# Patient Record
Sex: FEMALE | Race: ASIAN | NOT HISPANIC OR LATINO | Employment: UNEMPLOYED | URBAN - METROPOLITAN AREA
[De-identification: names, ages, dates, MRNs, and addresses within clinical notes are randomized per-mention and may not be internally consistent; named-entity substitution may affect disease eponyms.]

---

## 2022-12-17 ENCOUNTER — APPOINTMENT (OUTPATIENT)
Dept: CT IMAGING | Facility: HOSPITAL | Age: 47
End: 2022-12-17
Attending: EMERGENCY MEDICINE
Payer: COMMERCIAL

## 2022-12-17 ENCOUNTER — HOSPITAL ENCOUNTER (EMERGENCY)
Facility: HOSPITAL | Age: 47
Discharge: HOME | End: 2022-12-17
Attending: EMERGENCY MEDICINE | Admitting: EMERGENCY MEDICINE
Payer: COMMERCIAL

## 2022-12-17 VITALS
DIASTOLIC BLOOD PRESSURE: 72 MMHG | OXYGEN SATURATION: 100 % | RESPIRATION RATE: 18 BRPM | SYSTOLIC BLOOD PRESSURE: 138 MMHG | HEART RATE: 76 BPM | TEMPERATURE: 97.6 F

## 2022-12-17 DIAGNOSIS — M54.40 BACK PAIN OF LUMBAR REGION WITH SCIATICA: ICD-10-CM

## 2022-12-17 DIAGNOSIS — R10.9 LEFT FLANK PAIN: ICD-10-CM

## 2022-12-17 DIAGNOSIS — N39.0 URINARY TRACT INFECTION WITHOUT HEMATURIA, SITE UNSPECIFIED: ICD-10-CM

## 2022-12-17 LAB
ALBUMIN UR-MCNC: NEGATIVE MG/DL
ANION GAP SERPL CALCULATED.3IONS-SCNC: 13 MMOL/L (ref 7–15)
APPEARANCE UR: CLEAR
BACTERIA #/AREA URNS HPF: ABNORMAL /HPF
BASOPHILS # BLD AUTO: 0 10E3/UL (ref 0–0.2)
BASOPHILS NFR BLD AUTO: 0 %
BILIRUB UR QL STRIP: NEGATIVE
BUN SERPL-MCNC: 9.4 MG/DL (ref 6–20)
CALCIUM SERPL-MCNC: 9.1 MG/DL (ref 8.6–10)
CHLORIDE SERPL-SCNC: 100 MMOL/L (ref 98–107)
COLOR UR AUTO: ABNORMAL
CREAT SERPL-MCNC: 0.62 MG/DL (ref 0.51–0.95)
DEPRECATED HCO3 PLAS-SCNC: 24 MMOL/L (ref 22–29)
EOSINOPHIL # BLD AUTO: 0.2 10E3/UL (ref 0–0.7)
EOSINOPHIL NFR BLD AUTO: 2 %
ERYTHROCYTE [DISTWIDTH] IN BLOOD BY AUTOMATED COUNT: 12.5 % (ref 10–15)
GFR SERPL CREATININE-BSD FRML MDRD: >90 ML/MIN/1.73M2
GLUCOSE SERPL-MCNC: 88 MG/DL (ref 70–99)
GLUCOSE UR STRIP-MCNC: NEGATIVE MG/DL
HCG UR QL: NEGATIVE
HCT VFR BLD AUTO: 42.7 % (ref 35–47)
HGB BLD-MCNC: 13.4 G/DL (ref 11.7–15.7)
HGB UR QL STRIP: NEGATIVE
IMM GRANULOCYTES # BLD: 0 10E3/UL
IMM GRANULOCYTES NFR BLD: 0 %
KETONES UR STRIP-MCNC: NEGATIVE MG/DL
LEUKOCYTE ESTERASE UR QL STRIP: ABNORMAL
LYMPHOCYTES # BLD AUTO: 2.9 10E3/UL (ref 0.8–5.3)
LYMPHOCYTES NFR BLD AUTO: 26 %
MCH RBC QN AUTO: 29.1 PG (ref 26.5–33)
MCHC RBC AUTO-ENTMCNC: 31.4 G/DL (ref 31.5–36.5)
MCV RBC AUTO: 93 FL (ref 78–100)
MONOCYTES # BLD AUTO: 0.6 10E3/UL (ref 0–1.3)
MONOCYTES NFR BLD AUTO: 6 %
MUCOUS THREADS #/AREA URNS LPF: PRESENT /LPF
NEUTROPHILS # BLD AUTO: 7.5 10E3/UL (ref 1.6–8.3)
NEUTROPHILS NFR BLD AUTO: 66 %
NITRATE UR QL: NEGATIVE
NRBC # BLD AUTO: 0 10E3/UL
NRBC BLD AUTO-RTO: 0 /100
PH UR STRIP: 6 [PH] (ref 5–7)
PLATELET # BLD AUTO: 339 10E3/UL (ref 150–450)
POTASSIUM SERPL-SCNC: 3.5 MMOL/L (ref 3.4–5.3)
RBC # BLD AUTO: 4.6 10E6/UL (ref 3.8–5.2)
RBC URINE: 1 /HPF
SODIUM SERPL-SCNC: 137 MMOL/L (ref 136–145)
SP GR UR STRIP: 1.02 (ref 1–1.03)
SQUAMOUS EPITHELIAL: 9 /HPF
UROBILINOGEN UR STRIP-MCNC: <2 MG/DL
WBC # BLD AUTO: 11.2 10E3/UL (ref 4–11)
WBC URINE: 35 /HPF

## 2022-12-17 PROCEDURE — 74176 CT ABD & PELVIS W/O CONTRAST: CPT

## 2022-12-17 PROCEDURE — 99285 EMERGENCY DEPT VISIT HI MDM: CPT | Mod: 25

## 2022-12-17 PROCEDURE — 96374 THER/PROPH/DIAG INJ IV PUSH: CPT

## 2022-12-17 PROCEDURE — 87086 URINE CULTURE/COLONY COUNT: CPT | Performed by: EMERGENCY MEDICINE

## 2022-12-17 PROCEDURE — 250N000011 HC RX IP 250 OP 636: Performed by: EMERGENCY MEDICINE

## 2022-12-17 PROCEDURE — 81001 URINALYSIS AUTO W/SCOPE: CPT | Performed by: EMERGENCY MEDICINE

## 2022-12-17 PROCEDURE — 36415 COLL VENOUS BLD VENIPUNCTURE: CPT | Performed by: EMERGENCY MEDICINE

## 2022-12-17 PROCEDURE — 80048 BASIC METABOLIC PNL TOTAL CA: CPT | Performed by: EMERGENCY MEDICINE

## 2022-12-17 PROCEDURE — 81025 URINE PREGNANCY TEST: CPT | Performed by: EMERGENCY MEDICINE

## 2022-12-17 PROCEDURE — 85025 COMPLETE CBC W/AUTO DIFF WBC: CPT | Performed by: EMERGENCY MEDICINE

## 2022-12-17 PROCEDURE — 250N000013 HC RX MED GY IP 250 OP 250 PS 637: Performed by: EMERGENCY MEDICINE

## 2022-12-17 RX ORDER — CIPROFLOXACIN 500 MG/1
500 TABLET, FILM COATED ORAL 2 TIMES DAILY
Qty: 14 TABLET | Refills: 0 | Status: SHIPPED | OUTPATIENT
Start: 2022-12-17 | End: 2022-12-24

## 2022-12-17 RX ORDER — KETOROLAC TROMETHAMINE 15 MG/ML
15 INJECTION, SOLUTION INTRAMUSCULAR; INTRAVENOUS ONCE
Status: COMPLETED | OUTPATIENT
Start: 2022-12-17 | End: 2022-12-17

## 2022-12-17 RX ORDER — ACETAMINOPHEN 325 MG/1
650 TABLET ORAL ONCE
Status: COMPLETED | OUTPATIENT
Start: 2022-12-17 | End: 2022-12-17

## 2022-12-17 RX ORDER — OXYCODONE AND ACETAMINOPHEN 5; 325 MG/1; MG/1
1 TABLET ORAL ONCE
Status: COMPLETED | OUTPATIENT
Start: 2022-12-17 | End: 2022-12-17

## 2022-12-17 RX ORDER — CYCLOBENZAPRINE HCL 10 MG
10 TABLET ORAL ONCE
Status: COMPLETED | OUTPATIENT
Start: 2022-12-17 | End: 2022-12-17

## 2022-12-17 RX ORDER — CYCLOBENZAPRINE HCL 10 MG
10 TABLET ORAL 3 TIMES DAILY PRN
Qty: 20 TABLET | Refills: 0 | Status: SHIPPED | OUTPATIENT
Start: 2022-12-17 | End: 2022-12-24

## 2022-12-17 RX ORDER — NAPROXEN 500 MG/1
500 TABLET ORAL 2 TIMES DAILY WITH MEALS
Qty: 24 TABLET | Refills: 0 | Status: SHIPPED | OUTPATIENT
Start: 2022-12-17

## 2022-12-17 RX ADMIN — KETOROLAC TROMETHAMINE 15 MG: 15 INJECTION, SOLUTION INTRAMUSCULAR; INTRAVENOUS at 11:54

## 2022-12-17 RX ADMIN — ACETAMINOPHEN 650 MG: 325 TABLET ORAL at 11:54

## 2022-12-17 RX ADMIN — OXYCODONE HYDROCHLORIDE AND ACETAMINOPHEN 1 TABLET: 5; 325 TABLET ORAL at 13:33

## 2022-12-17 RX ADMIN — CYCLOBENZAPRINE 10 MG: 10 TABLET, FILM COATED ORAL at 11:54

## 2022-12-17 ASSESSMENT — ACTIVITIES OF DAILY LIVING (ADL): ADLS_ACUITY_SCORE: 35

## 2022-12-17 NOTE — DISCHARGE INSTRUCTIONS
You were seen in the emergency department at [facility] for left-sided flank pain and leg pain.  Your evaluation did suggest that you are having another urinary tract infection.  We are going to start you on an antibiotic for this.  We also think that your back and leg pains are suggestive of back pain and possibly sciatica.  In addition to the antibiotic we are going to prescribe you a couple of pain medications called naproxen which is a strong anti-inflammatory you can take twice a day as well as Flexeril which is a muscle relaxer you can use up to 3 times a day.  Please avoid strenuous activity as you rest and recover.  We would like you to follow-up in clinic within a week or 2 to review any ongoing symptoms.

## 2022-12-17 NOTE — ED TRIAGE NOTES
Patient with L sided back pain that radiates down the back of her L leg. Pain has been present since Friday. Patient denies history of this in the past.      Triage Assessment     Row Name 12/17/22 1128       Triage Assessment (Adult)    Airway WDL WDL       Respiratory WDL    Respiratory WDL WDL       Cardiac WDL    Cardiac WDL WDL

## 2022-12-17 NOTE — ED PROVIDER NOTES
EMERGENCY DEPARTMENT ENCOUNTER      NAME: Obinna Wilkins  AGE: 46 year old female  YOB: 1975  MRN: [id number]  EVALUATION DATE & TIME: No admission date for patient encounter.    PCP: No primary care provider on file.    ED PROVIDER: Daniel Longoria M.D.      Chief Complaint   Patient presents with     Back Pain       FINAL IMPRESSION:  1. Left flank pain    2. Back pain of lumbar region with sciatica    3. Urinary tract infection without hematuria, site unspecified        ED COURSE & MEDICAL DECISION MAKIN year old female presents to the Emergency Department for evaluation of left flank pain back pain and leg pain.  She is vitally stable and well-appearing when she arrives to the emergency department.  She indicates pain in her left upper flank and radiating down her entire back and to some degree down her posterior left leg.  Her exam seems more consistent with low back pain with sciatica however she also has a history of pyelonephritis and given that the pain does extend up her back, did elect to obtain CT without contrast which was negative for kidney stone.  Urinalysis does appear somewhat concerning for infection with pyuria, but with some squamous cell growth you will cells so possibly some degree of contamination.  She does have an elevated white blood cell count.  Discussed a treatment course of ciprofloxacin for pyelonephritis and patient was in agreement.  Also discussed NSAIDs and Flexeril for what may well be muscular skeletal back pain with some sciatica component.  She does not have any weakness of her lower extremities or anything to suggest the need for acute neuroimaging at this time.  Patient was in agreement with this plan.  She was discharged in stable condition.    At the conclusion of the encounter I discussed the results of all of the tests and the disposition. The questions were answered. The patient or family acknowledged understanding and was agreeable with the care plan.        Medical Decision Making    History:    Supplemental history from: Documented in HPI, if applicable    External Record(s) reviewed: Documented in HPI, if applicable.    Work Up:    Chart documentation includes differential considered and any EKGs or imaging interpreted by provider.    In additional to work up documented, I considered the following work up: See chart documentation, if applicable.    External consultation:    Discussion of management with another provider: See chart documentation, if applicable    Complicating factors:    Care impacted by chronic illness: None    Care affected by social determinants of health: N/A    Disposition considerations: Discharge. I prescribed additional prescription strength medication(s) as charted. N/A.            MEDICATIONS GIVEN IN THE EMERGENCY:  Medications   oxyCODONE-acetaminophen (PERCOCET) 5-325 MG per tablet 1 tablet (has no administration in time range)   ketorolac (TORADOL) injection 15 mg (15 mg Intravenous Given 12/17/22 1154)   cyclobenzaprine (FLEXERIL) tablet 10 mg (10 mg Oral Given 12/17/22 1154)   acetaminophen (TYLENOL) tablet 650 mg (650 mg Oral Given 12/17/22 1154)       NEW PRESCRIPTIONS STARTED AT TODAY'S ER VISIT  New Prescriptions    CIPROFLOXACIN (CIPRO) 500 MG TABLET    Take 1 tablet (500 mg) by mouth 2 times daily for 7 days    CYCLOBENZAPRINE (FLEXERIL) 10 MG TABLET    Take 1 tablet (10 mg) by mouth 3 times daily as needed for muscle spasms    NAPROXEN (NAPROSYN) 500 MG TABLET    Take 1 tablet (500 mg) by mouth 2 times daily (with meals)          =================================================================    [facility]    Patient information was obtained from: Patient    Use of : Yes (Phone) - Language Yessy Wilkins is a 46 year old female with no pertinent history who presents to this ED via walk-in for evaluation of back pain.    Patient endorses left lower back pain which has been constant for 2 days. She describes the  pain as sharp, saying it is worse with movement and radiates to her left flank and left leg. No recent trauma. Patient has been applying topical herbal remedies but has not taken any other medications.     Patient denies dysuria, hematuria, incontinence, weakness, fever, abdominal pain, bowel changes, and all other relevant symptoms.      REVIEW OF SYSTEMS   All systems reviewed and negative except as noted in HPI.    PAST MEDICAL HISTORY:  History reviewed. No pertinent past medical history.    PAST SURGICAL HISTORY:  History reviewed. No pertinent surgical history.        CURRENT MEDICATIONS:    Current Facility-Administered Medications   Medication     oxyCODONE-acetaminophen (PERCOCET) 5-325 MG per tablet 1 tablet     Current Outpatient Medications   Medication     ciprofloxacin (CIPRO) 500 MG tablet     cyclobenzaprine (FLEXERIL) 10 MG tablet     naproxen (NAPROSYN) 500 MG tablet     ondansetron (ZOFRAN ODT) 4 MG disintegrating tablet     phenazopyridine (PYRIDIUM) 200 MG tablet         ALLERGIES:  No Known Allergies    FAMILY HISTORY:  History reviewed. No pertinent family history.    SOCIAL HISTORY:   Social History     Socioeconomic History     Marital status:        VITALS:  /77   Pulse 81   Temp 97.6  F (36.4  C) (Temporal)   Resp 18   SpO2 100%     PHYSICAL EXAM    Constitutional: Well developed, Well nourished, NAD.  HENT: Normocephalic, Atraumatic. Neck Supple.  Eyes: EOMI, Conjunctiva normal.  Respiratory: Breathing comfortably on room air. Speaks full sentences easily. Lungs clear to ascultation.  Cardiovascular: Normal heart rate, Regular rhythm. No peripheral edema.  Abdomen: Soft, nontender.  There is left-sided CVA tenderness  Musculoskeletal: Good range of motion in all major joints. No major deformities noted.  Integument: Warm, Dry.  Neurologic: Alert & awake, Normal motor function, Normal sensory function, No focal deficits noted.  Patient is able to squat and stand on  tiptoes.  There is no saddle anesthesia.  Psychiatric: Cooperative. Affect appropriate.     LAB:  All pertinent labs reviewed and interpreted.  Labs Ordered and Resulted from Time of ED Arrival to Time of ED Departure   ROUTINE UA WITH MICROSCOPIC REFLEX TO CULTURE - Abnormal       Result Value    Color Urine Light Yellow      Appearance Urine Clear      Glucose Urine Negative      Bilirubin Urine Negative      Ketones Urine Negative      Specific Gravity Urine 1.018      Blood Urine Negative      pH Urine 6.0      Protein Albumin Urine Negative      Urobilinogen Urine <2.0      Nitrite Urine Negative      Leukocyte Esterase Urine 500 Glenn/uL (*)     Bacteria Urine Few (*)     Mucus Urine Present (*)     RBC Urine 1      WBC Urine 35 (*)     Squamous Epithelials Urine 9 (*)    CBC WITH PLATELETS AND DIFFERENTIAL - Abnormal    WBC Count 11.2 (*)     RBC Count 4.60      Hemoglobin 13.4      Hematocrit 42.7      MCV 93      MCH 29.1      MCHC 31.4 (*)     RDW 12.5      Platelet Count 339      % Neutrophils 66      % Lymphocytes 26      % Monocytes 6      % Eosinophils 2      % Basophils 0      % Immature Granulocytes 0      NRBCs per 100 WBC 0      Absolute Neutrophils 7.5      Absolute Lymphocytes 2.9      Absolute Monocytes 0.6      Absolute Eosinophils 0.2      Absolute Basophils 0.0      Absolute Immature Granulocytes 0.0      Absolute NRBCs 0.0     HCG QUALITATIVE URINE - Normal    hCG Urine Qualitative Negative     BASIC METABOLIC PANEL   URINE CULTURE       RADIOLOGY:  Reviewed all pertinent imaging. Please see official radiology report.  Abd/pelvis CT no contrast - Stone Protocol   Final Result   IMPRESSION:       1.  No urinary tract calculi or obstruction.   2.  No focal inflammatory process in the abdomen or pelvis.               Zachary JENSEN, am serving as a scribe to document services personally performed by Dr. Daniel Longoria, based on my observation and the provider's statements to me. Daniel JENSEN  MD Swati attest that Zachary Molina is acting in a scribe capacity, has observed my performance of the services and has documented them in accordance with my direction.    Daniel Longoria M.D.  Emergency Medicine  [facility] EMERGENCY DEPARTMENT  [address] [place] [id number]  [phone]  Dept: [phone]      Daniel Longoria MD  12/17/22 2924

## 2022-12-17 NOTE — ED NOTES
On hold waiting for  to begin triage for 20 minutes. Another RN now attempting to reach .   To get better and follow your care plan as instructed.

## 2022-12-18 LAB — BACTERIA UR CULT: NO GROWTH

## 2023-06-25 ENCOUNTER — HOSPITAL ENCOUNTER (EMERGENCY)
Facility: HOSPITAL | Age: 48
Discharge: HOME | End: 2023-06-25
Attending: FAMILY MEDICINE | Admitting: FAMILY MEDICINE
Payer: COMMERCIAL

## 2023-06-25 VITALS
HEART RATE: 78 BPM | RESPIRATION RATE: 18 BRPM | SYSTOLIC BLOOD PRESSURE: 113 MMHG | TEMPERATURE: 98.6 F | DIASTOLIC BLOOD PRESSURE: 70 MMHG | OXYGEN SATURATION: 98 %

## 2023-06-25 DIAGNOSIS — M54.16 LUMBAR RADICULOPATHY: ICD-10-CM

## 2023-06-25 PROCEDURE — 96372 THER/PROPH/DIAG INJ SC/IM: CPT | Performed by: FAMILY MEDICINE

## 2023-06-25 PROCEDURE — 250N000011 HC RX IP 250 OP 636: Mod: JZ | Performed by: FAMILY MEDICINE

## 2023-06-25 PROCEDURE — 99284 EMERGENCY DEPT VISIT MOD MDM: CPT

## 2023-06-25 RX ORDER — CYCLOBENZAPRINE HCL 10 MG
10 TABLET ORAL 3 TIMES DAILY PRN
Qty: 20 TABLET | Refills: 0 | Status: SHIPPED | OUTPATIENT
Start: 2023-06-25 | End: 2023-07-01

## 2023-06-25 RX ORDER — KETOROLAC TROMETHAMINE 30 MG/ML
30 INJECTION, SOLUTION INTRAMUSCULAR; INTRAVENOUS ONCE
Status: COMPLETED | OUTPATIENT
Start: 2023-06-25 | End: 2023-06-25

## 2023-06-25 RX ORDER — HYDROCODONE BITARTRATE AND ACETAMINOPHEN 5; 325 MG/1; MG/1
1 TABLET ORAL EVERY 6 HOURS PRN
Qty: 6 TABLET | Refills: 0 | Status: SHIPPED | OUTPATIENT
Start: 2023-06-25 | End: 2023-06-28

## 2023-06-25 RX ORDER — IBUPROFEN 400 MG/1
400 TABLET, FILM COATED ORAL EVERY 6 HOURS PRN
Qty: 20 TABLET | Refills: 0 | Status: SHIPPED | OUTPATIENT
Start: 2023-06-25 | End: 2023-07-06

## 2023-06-25 RX ORDER — KETOROLAC TROMETHAMINE 30 MG/ML
30 INJECTION, SOLUTION INTRAMUSCULAR; INTRAVENOUS ONCE
Status: DISCONTINUED | OUTPATIENT
Start: 2023-06-25 | End: 2023-06-25

## 2023-06-25 RX ORDER — PREDNISONE 10 MG/1
TABLET ORAL
Qty: 22 TABLET | Refills: 0 | Status: SHIPPED | OUTPATIENT
Start: 2023-06-25 | End: 2023-07-06

## 2023-06-25 RX ADMIN — KETOROLAC TROMETHAMINE 30 MG: 30 INJECTION, SOLUTION INTRAMUSCULAR; INTRAVENOUS at 15:48

## 2023-06-25 ASSESSMENT — ENCOUNTER SYMPTOMS: BACK PAIN: 1

## 2023-06-25 NOTE — Clinical Note
Obinna Wilkins was seen and treated in our emergency department on 6/25/2023.  She may return to work on 06/28/2023.       If you have any questions or concerns, please don't hesitate to call.      Jordan Easley MD

## 2023-06-25 NOTE — ED TRIAGE NOTES
Patient presents here with lower back pain that has occurred over approx one week. She does heavy lifting at work and she notes that upon standing up and twisting, the pain began. She reports that the pain radiates down her left leg. No loss of bowel or bladder function. She has attempted to take Tylenol with no relief of pain. Has not tried ice or heat therapy. She notes that weight bearing is very painful.     Information gathered with Yessy welleror, #[id number], from [employer].

## 2023-06-25 NOTE — ED PROVIDER NOTES
EMERGENCY DEPARTMENT ENCOUNTER      NAME: Obinna Wilkins  AGE: 47 year old female  YOB: 1975  MRN: [id number]  EVALUATION DATE & TIME: 6/25/2023  3:16 PM    PCP: No Ref-Primary, Physician    ED PROVIDER: Jordan Easley M.D.    Chief Complaint   Patient presents with     Back Pain       FINAL IMPRESSION:  1. Lumbar radiculopathy        ED COURSE & MEDICAL DECISION MAKING:    Pertinent Labs & Imaging studies independently interpreted by me. (See chart for details)  3:33 PM Patient seen and examined.  Patient presents with left buttock pain radiating into the left leg.  No red flag symptoms.  Strength and sensation are intact.  Toradol ordered in the emergency department, patient is stable for discharge with referral to spine care clinic.    At the conclusion of the encounter I discussed the results of all of the tests and the disposition. The questions were answered. The patient or family acknowledged understanding and was agreeable with the care plan.     Medical Decision Making    History:    Supplemental history from: Documented in chart, if applicable    External Record(s) reviewed: Documented in chart, if applicable.    Work Up:    Chart documentation includes differential considered and any EKGs or imaging independently interpreted by provider, where specified.    In additional to work up documented, I considered the following work up: Documented in chart, if applicable.    External consultation:    Discussion of management with another provider: Documented in chart, if applicable    Complicating factors:    Care impacted by chronic illness: N/A    Care affected by social determinants of health: N/A    Disposition considerations: Discharge. I prescribed additional prescription strength medication(s) as charted. See documentation for any additional details.    MEDICATIONS GIVEN IN THE EMERGENCY:  Medications   ketorolac (TORADOL) injection 30 mg (30 mg Intramuscular $Given 6/25/23 0329)       NEW  PRESCRIPTIONS STARTED AT TODAY'S ER VISIT  Discharge Medication List as of 6/25/2023  3:50 PM      START taking these medications    Details   cyclobenzaprine (FLEXERIL) 10 MG tablet Take 1 tablet (10 mg) by mouth 3 times daily as needed for muscle spasms, Disp-20 tablet, R-0, Local Print      HYDROcodone-acetaminophen (NORCO) 5-325 MG tablet Take 1 tablet by mouth every 6 hours as needed for severe pain, Disp-6 tablet, R-0, Local Print      ibuprofen (ADVIL/MOTRIN) 400 MG tablet Take 1 tablet (400 mg) by mouth every 6 hours as needed for moderate pain, Disp-20 tablet, R-0, Local Print      predniSONE (DELTASONE) 10 MG tablet Take 4 tablets (40 mg) by mouth daily for 3 days, THEN 2 tablets (20 mg) daily for 3 days, THEN 1 tablet (10 mg) daily for 3 days, THEN 0.5 tablets (5 mg) daily for 2 days., Disp-22 tablet, R-0, Local Print             =================================================================    HPI    Patient information was obtained from: Patient      Obinna Wilkins is a 47 year old female with no pertinent history on file who presents to this ED via private car for evaluation of back pain.    The patient reports one week of lower back pain with radiation down her left leg. She states that the pain began after lifting a heavy object at work and since worsened last night. No recent falls. No chance of pregnancy. She has not yet taken any pain medications.  No numbness or weakness of the legs.    Denies urinary or bowel incontinence or any other complaints at this time.    REVIEW OF SYSTEMS   Review of Systems   Genitourinary:        Negative for incontinence.   Musculoskeletal: Positive for back pain (lower).   All other systems reviewed and are negative.     All other systems reviewed and negative    PAST MEDICAL HISTORY:  History reviewed. No pertinent past medical history.    PAST SURGICAL HISTORY:  History reviewed. No pertinent surgical history.    CURRENT MEDICATIONS:    No current facility-administered  medications for this encounter.     Current Outpatient Medications   Medication     cyclobenzaprine (FLEXERIL) 10 MG tablet     HYDROcodone-acetaminophen (NORCO) 5-325 MG tablet     ibuprofen (ADVIL/MOTRIN) 400 MG tablet     predniSONE (DELTASONE) 10 MG tablet     naproxen (NAPROSYN) 500 MG tablet     ondansetron (ZOFRAN ODT) 4 MG disintegrating tablet     phenazopyridine (PYRIDIUM) 200 MG tablet       ALLERGIES:  No Known Allergies    FAMILY HISTORY:  History reviewed. No pertinent family history.    SOCIAL HISTORY:   Social History     Socioeconomic History     Marital status:        VITALS:  /70   Pulse 78   Temp 98.6  F (37  C) (Oral)   Resp 18   SpO2 98%     PHYSICAL EXAM:  Physical Exam  Vitals and nursing note reviewed.   Constitutional:       Appearance: Normal appearance.   HENT:      Head: Normocephalic and atraumatic.      Right Ear: External ear normal.      Left Ear: External ear normal.      Nose: Nose normal.      Mouth/Throat:      Mouth: Mucous membranes are moist.   Eyes:      Extraocular Movements: Extraocular movements intact.      Conjunctiva/sclera: Conjunctivae normal.      Pupils: Pupils are equal, round, and reactive to light.   Cardiovascular:      Rate and Rhythm: Normal rate and regular rhythm.   Pulmonary:      Effort: Pulmonary effort is normal.      Breath sounds: Normal breath sounds. No wheezing or rales.   Abdominal:      General: Abdomen is flat. There is no distension.      Palpations: Abdomen is soft.      Tenderness: There is no abdominal tenderness. There is no guarding.   Musculoskeletal:         General: Normal range of motion.      Cervical back: Normal range of motion and neck supple.      Right lower leg: No edema.      Left lower leg: No edema.      Comments: Left lumbar and buttocks tenderness.   Lymphadenopathy:      Cervical: No cervical adenopathy.   Skin:     General: Skin is warm and dry.   Neurological:      General: No focal deficit present.       Mental Status: She is alert and oriented to person, place, and time. Mental status is at baseline.      Comments: No gross focal neurologic deficits   Psychiatric:         Mood and Affect: Mood normal.         Behavior: Behavior normal.         Thought Content: Thought content normal.       I, Antionette Trae, am serving as a scribe to document services personally performed by Dr. Easley based on my observation and the provider's statements to me. I, Jordan Easley MD attest that Antionette Larkin is acting in a scribe capacity, has observed my performance of the services and has documented them in accordance with my direction.    Jordan Easley M.D.  Emergency Medicine  [facility] EMERGENCY DEPARTMENT  [facility] [facility] [id number] [phone]  Dept: [phone]     Jordan Easley MD  06/25/23 8449

## 2023-06-28 ENCOUNTER — HOSPITAL ENCOUNTER (EMERGENCY)
Facility: HOSPITAL | Age: 48
Discharge: HOME | End: 2023-06-28
Payer: COMMERCIAL

## 2023-06-28 VITALS
RESPIRATION RATE: 20 BRPM | DIASTOLIC BLOOD PRESSURE: 71 MMHG | SYSTOLIC BLOOD PRESSURE: 112 MMHG | WEIGHT: 139.2 LBS | OXYGEN SATURATION: 98 % | HEART RATE: 82 BPM | TEMPERATURE: 97.4 F

## 2023-06-28 DIAGNOSIS — M54.42 ACUTE LEFT-SIDED LOW BACK PAIN WITH LEFT-SIDED SCIATICA: ICD-10-CM

## 2023-06-28 PROCEDURE — 250N000013 HC RX MED GY IP 250 OP 250 PS 637

## 2023-06-28 PROCEDURE — 99284 EMERGENCY DEPT VISIT MOD MDM: CPT

## 2023-06-28 PROCEDURE — 250N000011 HC RX IP 250 OP 636: Mod: JZ

## 2023-06-28 PROCEDURE — 96372 THER/PROPH/DIAG INJ SC/IM: CPT | Mod: 59

## 2023-06-28 RX ORDER — KETOROLAC TROMETHAMINE 15 MG/ML
15 INJECTION, SOLUTION INTRAMUSCULAR; INTRAVENOUS ONCE
Status: COMPLETED | OUTPATIENT
Start: 2023-06-28 | End: 2023-06-28

## 2023-06-28 RX ORDER — CYCLOBENZAPRINE HCL 10 MG
10 TABLET ORAL ONCE
Status: COMPLETED | OUTPATIENT
Start: 2023-06-28 | End: 2023-06-28

## 2023-06-28 RX ORDER — LIDOCAINE 4 G/G
1 PATCH TOPICAL ONCE
Status: DISCONTINUED | OUTPATIENT
Start: 2023-06-28 | End: 2023-06-28 | Stop reason: HOSPADM

## 2023-06-28 RX ORDER — LIDOCAINE 4 G/G
1 PATCH TOPICAL EVERY 24 HOURS
Qty: 3 PATCH | Refills: 0 | Status: SHIPPED | OUTPATIENT
Start: 2023-06-28

## 2023-06-28 RX ORDER — OXYCODONE HYDROCHLORIDE 5 MG/1
5 TABLET ORAL ONCE
Status: COMPLETED | OUTPATIENT
Start: 2023-06-28 | End: 2023-06-28

## 2023-06-28 RX ORDER — ACETAMINOPHEN 500 MG
1000 TABLET ORAL ONCE
Status: COMPLETED | OUTPATIENT
Start: 2023-06-28 | End: 2023-06-28

## 2023-06-28 RX ADMIN — OXYCODONE HYDROCHLORIDE 5 MG: 5 TABLET ORAL at 10:13

## 2023-06-28 RX ADMIN — CYCLOBENZAPRINE 10 MG: 10 TABLET, FILM COATED ORAL at 11:51

## 2023-06-28 RX ADMIN — ACETAMINOPHEN 1000 MG: 500 TABLET ORAL at 11:51

## 2023-06-28 RX ADMIN — KETOROLAC TROMETHAMINE 15 MG: 15 INJECTION, SOLUTION INTRAMUSCULAR; INTRAVENOUS at 11:52

## 2023-06-28 RX ADMIN — LIDOCAINE 1 PATCH: 4 PATCH TOPICAL at 10:12

## 2023-06-28 ASSESSMENT — ACTIVITIES OF DAILY LIVING (ADL): ADLS_ACUITY_SCORE: 33

## 2023-06-28 ASSESSMENT — ENCOUNTER SYMPTOMS
DIARRHEA: 0
BACK PAIN: 1
CHILLS: 0
NUMBNESS: 0
FEVER: 0
WEAKNESS: 0
DIAPHORESIS: 0
DYSURIA: 0

## 2023-06-28 NOTE — ED TRIAGE NOTES
amb to triage .  Pt states pain started 1 wk ago.  Was seen here 4 days ago.  Called clinic and referred to ED for f/u.  C/o L leg pain continues despite taking meds rx'd on Sunday.

## 2023-06-28 NOTE — ED TRIAGE NOTES
Triage Assessment     Row Name 06/28/23 0934       Triage Assessment (Adult)    Airway WDL WDL       Respiratory WDL    Respiratory WDL WDL       Skin Circulation/Temperature WDL    Skin Circulation/Temperature WDL WDL       Cardiac WDL    Cardiac WDL WDL       Peripheral/Neurovascular WDL    Peripheral Neurovascular WDL WDL       Cognitive/Neuro/Behavioral WDL    Cognitive/Neuro/Behavioral WDL WDL

## 2023-06-28 NOTE — ED PROVIDER NOTES
EMERGENCY DEPARTMENT ENCOUNTER      NAME: Obinna Wilkins  AGE: 47 year old female  YOB: 1975  MRN: [id number]  EVALUATION DATE & TIME: 6/28/2023  9:39 AM    PCP: No Ref-Primary, Physician    ED PROVIDER: Renea Zavaleta PA-C      Chief Complaint   Patient presents with     Leg Pain         FINAL IMPRESSION:  1. Acute left-sided low back pain with left-sided sciatica          ED COURSE & MEDICAL DECISION MAKING:    10:00 AM I met with the patient, obtained history, performed an initial exam, and discussed options and plan for diagnostics and treatment here in the ED.   11:11 AM I attempted to recheck but the patient was in the bathroom.   11:29 AM I rechecked and updated the patient. Patient still symptomatic with minimal improvement. Confirmed last took medications at 7AM, will order additional pain medications and plan for discharge home with spine and PT referral. Patient able to ambulate in ED. I discussed the plan for discharge with the patient, and patient is agreeable. We discussed supportive cares at home and reasons for return to the ER including new or worsening symptoms. All questions and concerns addressed. Patient to be discharged by RN.    Pertinent Labs & Imaging studies reviewed. (See chart for details)  47 year old female presents to the Emergency Department for evaluation of 1-week history of non-traumatic left-sided low back and gluteal pain radiating down her posterior left leg. Per chart review, patient was last seen in the ED on 6/25/23 for left-sided radiculopathy, treated with Toradol and discharged home with oxycodone, flexeril, ibuprofen, and prednisone taper. Upon exam, patient is afebrile, hemodynamically stable, and in no acute distress. Left paraspinal muscular and left mid-gluteal tenderness improved with palpation/massage. No midline tenderness or overlying skin changes. Neurovascularly intact and strength preserved with toe-standing and heel-walking. 5/5 strength at hip, knee,  ankle, phalanges. Differential diagnosis includes but not limited to muscle spasm/strain, slipped disc with or without radicular pain including sciatica, cauda equina syndrome, vertebral fracture, vertebral tumor, epidural abscess/discitis, or pyelonephritis. Emergent MRI is not indicated as no new weakness or evidence of cauda equina syndrome (no saddle anesthesia, bowel/bladder incontinence/retention). Low suspicion for fracture as there was no preceding trauma/injury. Using shared decision making, no imaging of the spine was performed. Low suspicion for an epidural abscess as the patient denies hx of IVDU, no fevers/chills, or recent procedures. Low suspicion for infiltrative vertebral tumor as no associated weight loss, night sweats, or known history of cancer. No lower extremity swelling, tenderness along deep venous system, or red flags for DVT; therefore, US deferred.    Patient was treated with oxycodone, lidocaine patch, Toradol, tylenol, and flexeril upon arrival with moderate improvement in symptoms. Symptoms and workup most consistent with left-sided sciatica. Patient was made aware of the above findings. Plan to discharge patient home with prescription lidocaine patches and instruction to continue previous prescriptions as previously recommended. Discussed strict return precautions and close follow up with their PCP, PT, and Spine Specialist for reevaluation and ongoing management, Spine and PT referrals placed today. Also discussed masage therapy, acupuncture, dry-needling, and chiropractic options. The patient was stable and well appearing upon discharge. The patient was advised to return to the ER if any new or worsening symptoms develop. The patient verbalizes understanding and agrees with the plan.     Medical Decision Making    History:    Supplemental history from: Documented in chart, if applicable    External Record(s) reviewed: Outpatient Record: [facility] ED    Work Up:    Chart documentation  includes differential considered and any EKGs or imaging independently interpreted by provider, where specified.    In additional to work up documented, I considered the following work up: Documented in chart, if applicable.    External consultation:    Discussion of management with another provider: Documented in chart, if applicable    Complicating factors:    Care impacted by chronic illness: N/A    Care affected by social determinants of health: N/A    Disposition considerations: Discharge. I prescribed additional prescription strength medication(s) as charted. See documentation for any additional details.        MEDICATIONS GIVEN IN THE EMERGENCY:  Medications   Lidocaine (LIDOCARE) 4 % Patch 1 patch (1 patch Transdermal $Patch/Med Applied 6/28/23 1012)   oxyCODONE (ROXICODONE) tablet 5 mg (5 mg Oral $Given 6/28/23 1013)   acetaminophen (TYLENOL) tablet 1,000 mg (1,000 mg Oral $Given 6/28/23 1151)   ketorolac (TORADOL) injection 15 mg (15 mg Intramuscular $Given 6/28/23 1152)   cyclobenzaprine (FLEXERIL) tablet 10 mg (10 mg Oral $Given 6/28/23 1151)       NEW PRESCRIPTIONS STARTED AT TODAY'S ER VISIT  New Prescriptions    LIDOCAINE (LIDOCARE) 4 % PATCH    Place 1 patch onto the skin every 24 hours To prevent lidocaine toxicity, patient should be patch free for 12 hrs daily.          =================================================================    HPI    Patient information was obtained from: patient     Use of : Yes (Phone) Language: Yessy Wilkins is a 47 year old female with no pertinent history who presents to this ED via EMS for evaluation of leg pain.    The patient presents with 1 week of left leg pain that began in her lower back and then moved down her hips and into her leg. She first noticed it last week when she tried to get up from sitting and had too much pain to stand. She denies injury or trauma when it began. However, she lifts heavy objects at her job and would like a work  note due to this. She was seen for this pain 4 days ago and prescribed Flexeril 10 mg, Hydrocodone-acetaminophen 5-325 mg, ibuprofen 400 mg, and prednisone 10 mg. She took her morning dose of these already today. The medications have not been relieving her symptoms so she tried to get in to her PCP today but was told to come here. She has not traveled recently and does not have any clotting/bleeding disorders or previous DVT or PE. She has never had spinal procedures or IV drug use. Denies oral estrogen.    She denies numbness/tingling in her legs or groin. Denies diaphoresis, fever, chills, incontinence, bowel/bladder changes, leg swelling, or any other complaints at this time.       Chart Review:  Per chart review, the patient presented to the [facility] ED on 6/25/2023 for evaluation of lower back pain that radiated down to her leg. At that time she stated that it began after lifting a heavy object at work and got worse throughout the day. In the ED she was given an intramuscular Toradol injection and prescribed Flexeril 10 mg, Hydrocodone-acetaminophen 5-325 mg, ibuprofen 400 mg, and prednisone 10 mg. She was discharged after 1 hour and referred to spine care clinic.      REVIEW OF SYSTEMS   Review of Systems   Constitutional: Negative for chills, diaphoresis and fever.   Cardiovascular: Negative for leg swelling.   Gastrointestinal: Negative for diarrhea.   Genitourinary: Negative for dysuria.   Musculoskeletal: Positive for back pain.        Positive for left leg pain   Neurological: Negative for weakness and numbness.   All other systems reviewed and are negative.       PAST MEDICAL HISTORY:  History reviewed. No pertinent past medical history.    PAST SURGICAL HISTORY:  History reviewed. No pertinent surgical history.        CURRENT MEDICATIONS:    Lidocaine (LIDOCARE) 4 % Patch  cyclobenzaprine (FLEXERIL) 10 MG tablet  HYDROcodone-acetaminophen (NORCO) 5-325 MG tablet  ibuprofen (ADVIL/MOTRIN) 400 MG  tablet  naproxen (NAPROSYN) 500 MG tablet  ondansetron (ZOFRAN ODT) 4 MG disintegrating tablet  phenazopyridine (PYRIDIUM) 200 MG tablet  predniSONE (DELTASONE) 10 MG tablet        ALLERGIES:  No Known Allergies    FAMILY HISTORY:  History reviewed. No pertinent family history.    SOCIAL HISTORY:   Social History     Socioeconomic History     Marital status:        VITALS:  /71   Pulse 82   Temp 97.4  F (36.3  C) (Temporal)   Resp 20   Wt 63.1 kg (139 lb 3.2 oz)   LMP 06/05/2023   SpO2 98%     PHYSICAL EXAM    Constitutional:  Alert, appears uncomfortable. Cooperative.  EYES: Conjunctivae clear.  HENT:  Atraumatic, normocephalic.  Respiratory:  Respirations even, unlabored, in no acute respiratory distress.  Cardiovascular:  Regular rate, good peripheral perfusion.  GI: Soft, flat, non-distended.  Musculoskeletal: Left lower extremity: No bony tenderness to palpation. No edema or pain along deep venous system. Compartments soft. No overlying erythema, warmth, purulence, fluctuance, edema, ecchymosis, crepitus, or obvious bony deformity. Full ROM without pain. Neurovascularly intact distally. Cap refill <2 seconds. 2+ DP and PT pulses bilaterally.   Integument: Warm, Dry.   Neurologic:  Alert & oriented. Left paraspinal muscular and left mid-gluteal tenderness improved with palpation/massage. No midline cervical, thoracic, lumbar, or sacral tenderness to palpation. No overlying erythema, warmth, purulence, fluctuance, edema, crepitus, or obvious step off. Neurovascularly intact and strength preserved with toe-standing and heel-walking. 5/5 strength at hip, knee, ankle, phalanges. Gait intact.  Psych: Normal mood and affect.      I, Rogelio Bunn, am serving as a scribe to document services personally performed by Renea Zavaleta PA-C based on my observation and the provider's statements to me. I, Renea Zavaleta PA-C, attest that Rogelio Bunn is acting in a scribe capacity, has observed my  performance of the services and has documented them in accordance with my direction.    Renea Zavaleta PA-C  [facility] EMERGENCY DEPARTMENT  [facility] [facility] [id number] [phone]      Renea Zavaleta PA-C  06/28/23 [id number]

## 2023-06-28 NOTE — DISCHARGE INSTRUCTIONS
You were seen in the ER for back pain. You were treated with lidocaine patch, oxycodone, Toradol, tylenol, and Flexeril. You must take your lidocaine patch off after 12 hours as this can cause toxicity.    Rest, no heavy lifting. You may apply ice or heat to the area (do not apply ice directly to the skin). You may use a topical pain relieving patch such as Lidoderm or Icy Hot or Lidocaine patch (keep in place for up to 12 hours, do NOT keep on longer than 12 hours as this can cause toxicity). You can continue to use Ibuprofen every 6 hours (do not exceed 3200 mg in 24 hrs) and/or Tylenol (do not exceed 4000 mg in 24 hrs) as needed. You may take Norco as needed for severe pain - do NOT drive on this medication as it can cause drowsiness. Additionally, please take a stool softener as this medication can cause constipation. This medication can be addicting, please limit your use and discard any remaining tablets. You may also take Flexeril as previously directed.    Continue to take your prednisone (steroid) taper as prescribed.    Follow up with your Primary Care Provider or the Spine Center if your symptoms persist for reevaluation and ongoing management strategies including but not limited to possible injection and physical therapy. Physical therapy and Spine referrals were placed today.    Return to ER if any new or worsening symptoms develop including fever/chills, worsening pain, new numbness/tingling/weakness, loss of bowel or bladder function, or any other new or concerning symptoms.

## 2023-06-28 NOTE — Clinical Note
Obinna Wilkins was seen and treated in our emergency department on 6/28/2023.  She may return to work on 07/03/2023.  No heavy lifting, pushing, or pulling >5 lbs. No bending at waist or prolonged standing. Please allow for frequent breaks as needed for pain.     If you have any questions or concerns, please don't hesitate to call.      Renea Zavaleta PA-C

## 2023-06-29 ENCOUNTER — PRE VISIT (OUTPATIENT)
Dept: NEUROSURGERY | Facility: CLINIC | Age: 48
End: 2023-06-29

## 2023-06-29 ENCOUNTER — OFFICE VISIT (OUTPATIENT)
Dept: NEUROSURGERY | Facility: CLINIC | Age: 48
End: 2023-06-29
Payer: COMMERCIAL

## 2023-06-29 VITALS — OXYGEN SATURATION: 97 % | HEART RATE: 87 BPM | SYSTOLIC BLOOD PRESSURE: 111 MMHG | DIASTOLIC BLOOD PRESSURE: 83 MMHG

## 2023-06-29 DIAGNOSIS — M54.42 ACUTE LEFT-SIDED LOW BACK PAIN WITH LEFT-SIDED SCIATICA: ICD-10-CM

## 2023-06-29 DIAGNOSIS — R29.898 LEFT LEG WEAKNESS: Primary | ICD-10-CM

## 2023-06-29 PROCEDURE — 99205 OFFICE O/P NEW HI 60 MIN: CPT | Performed by: PHYSICIAN ASSISTANT

## 2023-06-29 RX ORDER — GABAPENTIN 100 MG/1
CAPSULE ORAL
Qty: 90 CAPSULE | Refills: 1 | Status: SHIPPED | OUTPATIENT
Start: 2023-06-29

## 2023-06-29 NOTE — PATIENT INSTRUCTIONS
MRI lumbar ordered.  PT ordered.  Gabapentin sent to pharmacy downstairs.    Follow up 2-3 days after the MRI has resulted.    Work note provided to patient.

## 2023-06-29 NOTE — PROGRESS NOTES
Neurosurgery Clinic Note    Chief Complaint: LLE pain    History of Present Illness:  It was a pleasure to evaluate Obinna Wilkins in clinic today at the kind referral of   Renea Zavaleta PA-C  EMERGENCY CARE  [address]  [place] [address].    Obinna Wilkins is a 47 year old female without a significant PMH who is presenting today for follow up from the ED on 6/25/23 and 6/28/23 for evaluation of low back pain with LLE radiculopathy.     Patient has  available on Ipad.      She reports about 1.5 weeks of pain that was present when she woke up one morning.  He is unable to get comfortable in any position.  It hurts to sit on the left side of buttock so she shifts her weight to the right side.  She is unable to walk or stand.  The pain is intense aching and is unbearable 10/10 pain.  The pain starts in her low back, goes into her left buttock down the back side of her thigh, lateral calf and side of left foot.  She denies issues in her right leg or b/b issues.  Her pain has progressed since it started despite taking medications that were prescribed to her.  She has a few days left of the steroid.  She feels her left leg is weak.  She has not had any pain like this before.    She works at [employer] and has to stand for long hours and lift heavy objects.  She needs a work note to be out of work.  She has been absent for the last week.      Conservative Treatment:  Flexeril  Norco  Prednisone taper  Toradol injection  Ibuprofen  Lidocaine patch    No past medical history on file.    No past surgical history on file.    Social History     Socioeconomic History     Marital status:        family history is not on file.       IMAGING   No imaging available.      Nicotine Usage:  Uses chew tobacco/nicotine per cultural custom     Physical Exam   LMP 06/05/2023     Constitutional: Appears well-developed and well-nourished. Cooperative. No acute distress.   HENT:   Head:  Normocephalic and atraumatic.   Eyes: Conjunctivae are normal.  Neck: Neck supple. No tracheal deviation present.  Cardiovascular: Normal rate and regular rhythm.    Pulmonary/Chest: Effort normal and breath sounds normal.  Abdominal: Exhibits no obvious distension.   Musculoskeletal: Able to move all extremities.  No involuntary motor movements. +diffuse left sacral/buttock TTP. +straight leg left, +MARTINE left, neg piriformis stretch  Lumbar flexion/extension range of motion: unable to stand unassisted, so unable to test  Skin: Skin is warm, dry and intact.   Psychiatric: Normal mood and affect. Speech is normal and behavior is normal.    Neurological:  Alert, NAD, and oriented to person, place, and time.   No cranial nerve deficit   Gait: requires something to hold onto, walks bent over, antalgic gait favor the left    Strength (L/R)  2/5 Hip Flexion  4/5 Knee Extension  3/5 Ankle Dorsiflexion  4/5 Extensor Hallucis Longus  3/5 Plantar Flexion  3/5 Foot Eversion  3/5 Foot Inversion    No ankle clonus    Sensation: SILT       ASSESSMENT:  Obinna Wilkins is a 47 year old female without a significant PMH who is presenting today for follow up from the ED on 6/25/23 and 6/28/23 for evaluation of low back pain with LLE radiculopathy with weakness that has been present for about 1.5 weeks. Her pain and weakness have progressed and she is having difficulty with ambulating.      PLAN:    PT acute spine care ordered- urgent  Lumbar MRI ordered - follow up 2-3 days after done  Gabapentin prescription sent to pharmacy downstairs  Work note provided to remain off work until after lumbar MRI/PT.      I spent 63 minutes spent in patient care, independent review and interpretation of medical records/imaging, reviewing old records.      Mickie Pretty PA-C  Department of Neurosurgery  Office: [phone]

## 2023-06-29 NOTE — LETTER
6/29/2023       RE: Obinna Wilkins  [address]  [place] [address]       Dear Colleague,    Thank you for referring your patient, Obinna Wilkins, to the [facility] NEUROSURGERY CLINIC [place] [facility]. Please see a copy of my visit note below.        Neurosurgery Clinic Note    Chief Complaint: LLE pain    History of Present Illness:  It was a pleasure to evaluate Obinna Wilkins in clinic today at the kind referral of   Renea Zavaleta PA-C  EMERGENCY CARE  [address]  [place] [address].    Obinna Wilkins is a 47 year old female without a significant PMH who is presenting today for follow up from the ED on 6/25/23 and 6/28/23 for evaluation of low back pain with LLE radiculopathy.     Patient has  available on Ipad.      She reports about 1.5 weeks of pain that was present when she woke up one morning.  He is unable to get comfortable in any position.  It hurts to sit on the left side of buttock so she shifts her weight to the right side.  She is unable to walk or stand.  The pain is intense aching and is unbearable 10/10 pain.  The pain starts in her low back, goes into her left buttock down the back side of her thigh, lateral calf and side of left foot.  She denies issues in her right leg or b/b issues.  Her pain has progressed since it started despite taking medications that were prescribed to her.  She has a few days left of the steroid.  She feels her left leg is weak.  She has not had any pain like this before.    She works at [employer] and has to stand for long hours and lift heavy objects.  She needs a work note to be out of work.  She has been absent for the last week.      Conservative Treatment:  Flexeril  Norco  Prednisone taper  Toradol injection  Ibuprofen  Lidocaine patch    No past medical history on file.    No past surgical history on file.    Social History     Socioeconomic History    Marital  status:        family history is not on file.       IMAGING   No imaging available.      Nicotine Usage:  Uses chew tobacco/nicotine per cultural custom     Physical Exam   LMP 06/05/2023     Constitutional: Appears well-developed and well-nourished. Cooperative. No acute distress.   HENT:   Head: Normocephalic and atraumatic.   Eyes: Conjunctivae are normal.  Neck: Neck supple. No tracheal deviation present.  Cardiovascular: Normal rate and regular rhythm.    Pulmonary/Chest: Effort normal and breath sounds normal.  Abdominal: Exhibits no obvious distension.   Musculoskeletal: Able to move all extremities.  No involuntary motor movements. +diffuse left sacral/buttock TTP. +straight leg left, +MARTINE left, neg piriformis stretch  Lumbar flexion/extension range of motion: unable to stand unassisted, so unable to test  Skin: Skin is warm, dry and intact.   Psychiatric: Normal mood and affect. Speech is normal and behavior is normal.    Neurological:  Alert, NAD, and oriented to person, place, and time.   No cranial nerve deficit   Gait: requires something to hold onto, walks bent over, antalgic gait favor the left    Strength (L/R)  2/5 Hip Flexion  4/5 Knee Extension  3/5 Ankle Dorsiflexion  4/5 Extensor Hallucis Longus  3/5 Plantar Flexion  3/5 Foot Eversion  3/5 Foot Inversion    No ankle clonus    Sensation: SILT       ASSESSMENT:  Obinna Wilkins is a 47 year old female without a significant PMH who is presenting today for follow up from the ED on 6/25/23 and 6/28/23 for evaluation of low back pain with LLE radiculopathy with weakness that has been present for about 1.5 weeks. Her pain and weakness have progressed and she is having difficulty with ambulating.      PLAN:    PT acute spine care ordered- urgent  Lumbar MRI ordered - follow up 2-3 days after done  Gabapentin prescription sent to pharmacy downstairs  Work note provided to remain off work until after lumbar MRI/PT.      I spent 63 minutes spent in  patient care, independent review and interpretation of medical records/imaging, reviewing old records.        Again, thank you for allowing me to participate in the care of your patient.      Sincerely,    Mickie Pretty PA-C

## 2023-06-29 NOTE — LETTER
June 29, 2023    RE:  Obinna Wilkins                              [address]   [place] [address]      To whom it may concern:    Obinna Wilkins is under my professional care for back pain and left leg weakness. She is unable to work currently while we investigate the cause of her symptoms and she undergoes Physical Therapy.      Sincerely,        Mickie Pretty PA-C

## 2023-06-29 NOTE — NURSING NOTE
Chief Complaint   Patient presents with     Consult     Malia Peña CMA at 3:41 PM on 6/29/2023.

## 2023-06-29 NOTE — TELEPHONE ENCOUNTER
RECORDS RECEIVED FROM: Internal    REASON FOR VISIT: Acute left-sided low back pain with left-sided sciatica    Date of Appt: 6/29/23 3:30pm    NOTES (FOR ALL VISITS) STATUS DETAILS   OFFICE NOTE from referring provider Internal ED Referral   DISCHARGE REPORT from the ER Internal 6/28/23 Renea Zavaleta PA-C @[facility] ED    6/25/23 Jordan Easley MD @ [facility] ED    12/17/22 Daniel Longoria MD @[facility] ED     MEDICATION LIST Internal    IMAGING  (FOR ALL VISITS)     MRI (HEAD, NECK, SPINE) N/A No Imaging per Appt Notes   CT (HEAD, NECK, SPINE) N/A No Imaging per Appt Notes

## 2023-07-06 ENCOUNTER — OFFICE VISIT (OUTPATIENT)
Dept: FAMILY MEDICINE | Facility: CLINIC | Age: 48
End: 2023-07-06
Payer: COMMERCIAL

## 2023-07-06 VITALS
DIASTOLIC BLOOD PRESSURE: 84 MMHG | OXYGEN SATURATION: 97 % | BODY MASS INDEX: 32.39 KG/M2 | HEIGHT: 56 IN | RESPIRATION RATE: 20 BRPM | WEIGHT: 144 LBS | HEART RATE: 85 BPM | TEMPERATURE: 98 F | SYSTOLIC BLOOD PRESSURE: 125 MMHG

## 2023-07-06 DIAGNOSIS — M54.16 ACUTE LEFT LUMBAR RADICULOPATHY: Primary | ICD-10-CM

## 2023-07-06 PROCEDURE — 99203 OFFICE O/P NEW LOW 30 MIN: CPT | Mod: GC

## 2023-07-06 RX ORDER — IBUPROFEN 400 MG/1
400 TABLET, FILM COATED ORAL EVERY 6 HOURS PRN
Qty: 20 TABLET | Refills: 0 | Status: SHIPPED | OUTPATIENT
Start: 2023-07-06

## 2023-07-06 NOTE — LETTER
[facility] [address]  [place] [address]  Phone: [phone]  Fax: [phone]    July 6, 2023        Obinna Wilkins  [address]   [place] [address]          To whom it may concern:    RE: Obinna Wilkins    Patient was seen today in clinic. Please excuse from work from 7/6 through 7/14.    Please contact me for questions or concerns.      Sincerely,        Nomra Russell MD

## 2023-07-06 NOTE — PROGRESS NOTES
"  Assessment & Plan     Acute left lumbar radiculopathy  Patient seen in ED two times for this. Started on multiple medications, referred to spine and PT. Also told to see PCP. Patient already has appointment for MRI, neurosurgery and PT scheduled that was ordered by ED provider. No change in symptoms since ED. Possible slipped disc vs gluteal spasm pinching nerve. No red flag symptoms in history or exam. Will defer to neurosurgery now that patient is already scheduled with them.   - ibuprofen (ADVIL/MOTRIN) 400 MG tablet; Take 1 tablet (400 mg) by mouth every 6 hours as needed for moderate pain       Norma Russell MD  [facility] Subjective Dah is a 47 year old, presenting for the following health issues:  Pain (Lower Back pain come and goes, this time it get worse  and it stay for 3 weeks , pain shooting down the leg)        7/6/2023     3:10 PM   Additional Questions   Roomed by [facility]   Accompanied by Self     HPI   Back pain:  -Seen twice in ED for this pain recently -- scheduled an MRI for July 11th, neurosurgery 7/13 and PT 8/10  -was told to follow-up with PCP for this  -diagnosed with acute lumbar back pain with radiculopathy  -pain present for one year -- no injury but then more recently developed radiculopathy down left leg   -no change in pain since being seen in ED  -no loss of bowel, bladder, fever, chills    Review of Systems   Constitutional, HEENT, cardiovascular, pulmonary, gi and gu systems are negative, except as otherwise noted.      Objective    /84   Pulse 85   Temp 98  F (36.7  C) (Oral)   Resp 20   Ht 1.41 m (4' 7.51\")   Wt 65.3 kg (144 lb)   LMP 06/05/2023   SpO2 97%   BMI 32.85 kg/m    Body mass index is 32.85 kg/m .  Physical Exam   GENERAL: Healthy, alert and no distress  EYES: Eyes grossly normal to inspection.  No discharge or erythema, or obvious scleral/conjunctival abnormalities.  RESP:  Lungs clear throughout. No wheeze or crackles. "   CV: Heart RRR. No murmur  Abdomen: Soft, nontender, nondistended.  MSK: Left sided paraspinal tenderness. Left midgluteal tenderness. Positive straight leg raise on right. 5/5 strength to bilateral lower extremities. Normal sensation to bilateral lower extremities. No gross deformity.   SKIN: Visible skin clear. No significant rash, abnormal pigmentation or lesions.  NEURO: Cranial nerves grossly intact.  Mentation and speech appropriate for age.  PSYCH: Mentation appears normal, affect normal/bright, judgement and insight intact, normal speech and appearance well-groomed.

## 2023-07-06 NOTE — PROGRESS NOTES
Preceptor Attestation:   Patient seen, evaluated and discussed with the resident. I have verified the content of the note, which accurately reflects my assessment of the patient and the plan of care.  Supervising Physician:Yuni Dalal MD  [facility]

## 2023-07-11 ENCOUNTER — ANCILLARY PROCEDURE (OUTPATIENT)
Dept: MRI IMAGING | Facility: CLINIC | Age: 48
End: 2023-07-11
Attending: PHYSICIAN ASSISTANT
Payer: COMMERCIAL

## 2023-07-11 DIAGNOSIS — R29.898 LEFT LEG WEAKNESS: ICD-10-CM

## 2023-07-11 DIAGNOSIS — M54.42 ACUTE LEFT-SIDED LOW BACK PAIN WITH LEFT-SIDED SCIATICA: ICD-10-CM

## 2023-07-11 PROCEDURE — 72148 MRI LUMBAR SPINE W/O DYE: CPT | Mod: GC | Performed by: RADIOLOGY

## 2023-07-13 ENCOUNTER — OFFICE VISIT (OUTPATIENT)
Dept: NEUROSURGERY | Facility: CLINIC | Age: 48
End: 2023-07-13
Payer: COMMERCIAL

## 2023-07-13 ENCOUNTER — HOSPITAL ENCOUNTER (EMERGENCY)
Facility: HOSPITAL | Age: 48
End: 2023-07-13
Payer: COMMERCIAL

## 2023-07-13 VITALS
WEIGHT: 137 LBS | SYSTOLIC BLOOD PRESSURE: 108 MMHG | HEIGHT: 56 IN | HEART RATE: 96 BPM | BODY MASS INDEX: 30.82 KG/M2 | OXYGEN SATURATION: 97 % | RESPIRATION RATE: 12 BRPM | DIASTOLIC BLOOD PRESSURE: 76 MMHG

## 2023-07-13 DIAGNOSIS — R29.898 LEFT LEG WEAKNESS: ICD-10-CM

## 2023-07-13 DIAGNOSIS — M54.16 LUMBAR RADICULOPATHY: Primary | ICD-10-CM

## 2023-07-13 PROCEDURE — 99215 OFFICE O/P EST HI 40 MIN: CPT | Performed by: PHYSICIAN ASSISTANT

## 2023-07-13 PROCEDURE — 99417 PROLNG OP E/M EACH 15 MIN: CPT | Performed by: PHYSICIAN ASSISTANT

## 2023-07-13 ASSESSMENT — PAIN SCALES - GENERAL: PAINLEVEL: NO PAIN (0)

## 2023-07-13 NOTE — PROGRESS NOTES
Neurosurgery Clinic Note    Chief Complaint: LLE pain    History of Present Illness:  Obinna Wilkins is a 47 year old female without a significant PMH who is being seen for f/u from the ED on 6/25/23 and 6/28/23 for low back pain with LLE radiculopathy.     6/29/23 Visit - She reports about 1.5 weeks of pain that was present when she woke up one morning.  He is unable to get comfortable in any position.  It hurts to sit on the left side of buttock so she shifts her weight to the right side.  She is unable to walk or stand.  The pain is intense aching and is unbearable 10/10 pain.  The pain starts in her low back, goes into her left buttock down the back side of her thigh, lateral calf and side of left foot.  She denies issues in her right leg or b/b issues.  Her pain has progressed since it started despite taking medications that were prescribed to her.  She has a few days left of the steroid.  She feels her left leg is weak.  She has not had any pain like this before.    She works at [employer] and has to stand for long hours and lift heavy objects.  She needs a work note to be out of work.  She has been absent for the last week.      Conservative Treatment:  Flexeril  Norco  Prednisone taper  Toradol injection  Ibuprofen  Lidocaine patch    7/13/23 Visit - patient returns today for review of lumbar MRI.  She has returned back to work, but is in a seated position.  She reports taking the gabapentin I prescribed at her last visit, but is unsure what does she is taking and how often.  She did not bring her medications with her and is by herself today.  She continues to express worse pain when she is standing or walking down the back of her left leg and into her foot.  She does feel this is better than it was at her last visit, but is still very debilitating.  She states it is a very intense ache, burning sensation.  She is also weak in her left leg, which has subtly improved.        Social History      Socioeconomic History     Marital status:       IMAGING     Lumbar MRI - L4/5 w/ bilat lat recess sten & contact of L5 NR.  L5/S1 FS w/ contact of the left S1 nerve and left L5 NR impingement.             Findings: There are 5 lumbar-type vertebrae, counting down from the  C2.  The tip of the conus medullaris is at L1.  Normal lumbar  vertebral alignment.  There is no significant disc height narrowing.   Mild disc desiccation at L4-5 and L5-S1. Normal marrow signal.  On a level by level basis:  T12-L1: No spinal canal or neuroforaminal stenosis.  L1-2: No spinal canal or neuroforaminal stenosis.  L2-3: No spinal canal or neuroforaminal stenosis.  L3-4: Mild facet and ligamentum flavum hypertrophy . Bilateral mild  neural foraminal stenosis. No spinal canal stenosis.  L4-5: Small posterior disc bulge with annular fissuring. Bilateral  facet hypertrophy and ligamentum flavum hypertrophy. Narrowing of the  bilateral lateral recesses with abutment of descending L5 nerve roots  (series 18, image 45). Bilateral mild-to-moderate neural foraminal  stenosis. Mild spinal canal stenosis.  L5-S1: Mild posterior disc bulge and superimposed left subarticular  disc extrusion migrating inferiorly. Impingement of the traversing  left L5 nerve and abutment of the descending left S1 nerve root.  Bilateral facet hypertrophy. Mild to moderate left and mild right  neuroforaminal stenosis. No right neural foraminal stenosis. No severe  spinal canal stenosis. Paraspinous tissues are within normal limits.  Impression: Lumbar spondylosis at L3-S1, most pronounced with left  subarticular L5-S1 disc extrusion. Possible impingement of the  traversing left L5 nerve and abutment of the descending left S1 nerve  root. No severe spinal canal or neural foraminal stenosis at any  level.    Nicotine Usage:  Uses chew tobacco/nicotine per cultural custom     Physical Exam   LMP 06/05/2023     Neurological:  Alert, NAD, and oriented to  person, place, and time.   No cranial nerve deficit   Gait: antalgic gait favoring the left.  Walking independently (improved from prior visit)  Unable to do single leg calf raises on left.    +straight leg on left    Strength (L/R)  3/5 Hip Flexion (improved from 3)  4+/5 Knee Extension (improved from 4)  3/5 Ankle Dorsiflexion (same)  3/5 Extensor Hallucis Longus (similar to prior)  3/5 Plantar Flexion (same)  3/5 Foot Eversion  3/5 Foot Inversion    No ankle clonus    Sensation: SILT?       ASSESSMENT:  Obinna Wilkins is a PMH who is being seen for f/u from the ED on 6/25/23 and 6/28/23 for low back pain with LLE radiculopathy.  Her symptoms are concerning for left L5 and S1 radiculopathies and weakness.  She return today after having MRI lumbar which reveals disc herniation compressing the left L5 NR and contacting S1.      She has not yet been able to start PT which is scheduled for 8/10.  I have sent a message to the provider and asked if they might be able to get her in more urgently given her weakness.      Her pain is still debilitating, we discussed her options which included continued medical management, injections and surgery.  She would like to try an injection and do PT if her pain can be improved enough for her to tolerated it.  She does have significant left foot and leg weakness, which is only marginally better so we will need to monitor her closely to make sure she shows improvement in the next few weeks or she will need surgery to decompress the nerve to prevent more permanent issues.    PLAN:    PT pending 8/10 - requested a more urgent appointment  Continue Gabapentin - requested she bring her prescriptions in next time so I can confirm she is taking correctly  Work note provided again today.  No lifting more than 10 pounds, frequent position changes, avoid activities that cause more pain.   Order for L5/S1 TFESI on left placed - urgent.   Follow up in 2-3 weeks after the injection.      I spent 70  minutes spent in patient care, independent review and interpretation of medical records/imaging, reviewing old records.      Mickie Pretty PA-C  Department of Neurosurgery  Office: [phone]

## 2023-07-13 NOTE — PATIENT INSTRUCTIONS
Referral for injection placed.  They will call you to schedule in the next couple of days.  If they do not reach you, please call them.      We will try to get you into PT sooner than 8/10 because of the weakness you have in your leg.      Follow up with Mickie Pretty PA-C 2-3 weeks after the injection.     Work note provided to patient.  No lifting more than 10 pounds, avoid activities that cause more pain.      Continue with gabapentin.

## 2023-07-13 NOTE — LETTER
7/13/23    RE:  Obinna Wilkins                              [address]   [place] [address]      To whom it may concern:    Obinna Wilkins is under my professional care for back pain and left leg weakness. She should continue to avoid lifting more than 10 pounds and avoid bending/twisting activities that cause her pain. Please allow her to sit and change positions as needed throughout her shift.  She will be undergoing therapies to help her get better and follow up with me after an injection to review her restrictions.      Sincerely,        Mickie Pretty PA-C  Department of Neurosurgery  Office: [phone]

## 2023-07-13 NOTE — LETTER
7/13/2023       RE: Obinna Wilkins  [address]  [place] [address]       Dear Colleague,    Thank you for referring your patient, Obinna Wilkins, to the [facility] [place] [facility]. Please see a copy of my visit note below.        Neurosurgery Clinic Note    Chief Complaint: LLE pain    History of Present Illness:  Obinna Wilkins is a 47 year old female without a significant PMH who is being seen for f/u from the ED on 6/25/23 and 6/28/23 for low back pain with LLE radiculopathy.     6/29/23 Visit - She reports about 1.5 weeks of pain that was present when she woke up one morning.  He is unable to get comfortable in any position.  It hurts to sit on the left side of buttock so she shifts her weight to the right side.  She is unable to walk or stand.  The pain is intense aching and is unbearable 10/10 pain.  The pain starts in her low back, goes into her left buttock down the back side of her thigh, lateral calf and side of left foot.  She denies issues in her right leg or b/b issues.  Her pain has progressed since it started despite taking medications that were prescribed to her.  She has a few days left of the steroid.  She feels her left leg is weak.  She has not had any pain like this before.    She works at [employer] and has to stand for long hours and lift heavy objects.  She needs a work note to be out of work.  She has been absent for the last week.      Conservative Treatment:  Flexeril  Norco  Prednisone taper  Toradol injection  Ibuprofen  Lidocaine patch    7/13/23 Visit - patient returns today for review of lumbar MRI.  She has returned back to work, but is in a seated position.  She reports taking the gabapentin I prescribed at her last visit, but is unsure what does she is taking and how often.  She did not bring her medications with her and is by herself today.  She continues to express worse pain when she is  standing or walking down the back of her left leg and into her foot.  She does feel this is better than it was at her last visit, but is still very debilitating.  She states it is a very intense ache, burning sensation.  She is also weak in her left leg, which has subtly improved.        Social History     Socioeconomic History    Marital status:       IMAGING     Lumbar MRI - L4/5 w/ bilat lat recess sten & contact of L5 NR.  L5/S1 FS w/ contact of the left S1 nerve and left L5 NR impingement.             Findings: There are 5 lumbar-type vertebrae, counting down from the  C2.  The tip of the conus medullaris is at L1.  Normal lumbar  vertebral alignment.  There is no significant disc height narrowing.   Mild disc desiccation at L4-5 and L5-S1. Normal marrow signal.  On a level by level basis:  T12-L1: No spinal canal or neuroforaminal stenosis.  L1-2: No spinal canal or neuroforaminal stenosis.  L2-3: No spinal canal or neuroforaminal stenosis.  L3-4: Mild facet and ligamentum flavum hypertrophy . Bilateral mild  neural foraminal stenosis. No spinal canal stenosis.  L4-5: Small posterior disc bulge with annular fissuring. Bilateral  facet hypertrophy and ligamentum flavum hypertrophy. Narrowing of the  bilateral lateral recesses with abutment of descending L5 nerve roots  (series 18, image 45). Bilateral mild-to-moderate neural foraminal  stenosis. Mild spinal canal stenosis.  L5-S1: Mild posterior disc bulge and superimposed left subarticular  disc extrusion migrating inferiorly. Impingement of the traversing  left L5 nerve and abutment of the descending left S1 nerve root.  Bilateral facet hypertrophy. Mild to moderate left and mild right  neuroforaminal stenosis. No right neural foraminal stenosis. No severe  spinal canal stenosis. Paraspinous tissues are within normal limits.  Impression: Lumbar spondylosis at L3-S1, most pronounced with left  subarticular L5-S1 disc extrusion. Possible impingement of  the  traversing left L5 nerve and abutment of the descending left S1 nerve  root. No severe spinal canal or neural foraminal stenosis at any  level.    Nicotine Usage:  Uses chew tobacco/nicotine per cultural custom     Physical Exam   LMP 06/05/2023     Neurological:  Alert, NAD, and oriented to person, place, and time.   No cranial nerve deficit   Gait: antalgic gait favoring the left.  Walking independently (improved from prior visit)  Unable to do single leg calf raises on left.    +straight leg on left    Strength (L/R)  3/5 Hip Flexion (improved from 3)  4+/5 Knee Extension (improved from 4)  3/5 Ankle Dorsiflexion (same)  3/5 Extensor Hallucis Longus (similar to prior)  3/5 Plantar Flexion (same)  3/5 Foot Eversion  3/5 Foot Inversion    No ankle clonus    Sensation: SILT?       ASSESSMENT:  Obinna Wilkins is a PMH who is being seen for f/u from the ED on 6/25/23 and 6/28/23 for low back pain with LLE radiculopathy.  Her symptoms are concerning for left L5 and S1 radiculopathies and weakness.  She return today after having MRI lumbar which reveals disc herniation compressing the left L5 NR and contacting S1.      She has not yet been able to start PT which is scheduled for 8/10.  I have sent a message to the provider and asked if they might be able to get her in more urgently given her weakness.      Her pain is still debilitating, we discussed her options which included continued medical management, injections and surgery.  She would like to try an injection and do PT if her pain can be improved enough for her to tolerated it.  She does have significant left foot and leg weakness, which is only marginally better so we will need to monitor her closely to make sure she shows improvement in the next few weeks or she will need surgery to decompress the nerve to prevent more permanent issues.    PLAN:    PT pending 8/10 - requested a more urgent appointment  Continue Gabapentin - requested she bring her prescriptions in  next time so I can confirm she is taking correctly  Work note provided again today.  No lifting more than 10 pounds, frequent position changes, avoid activities that cause more pain.   Order for L5/S1 TFESI on left placed - urgent.   Follow up in 2-3 weeks after the injection.      I spent 70 minutes spent in patient care, independent review and interpretation of medical records/imaging, reviewing old records.            Again, thank you for allowing me to participate in the care of your patient.      Sincerely,    Mickie Pretty PA-C

## 2023-07-19 ENCOUNTER — PREP FOR PROCEDURE (OUTPATIENT)
Dept: PALLIATIVE MEDICINE | Facility: OTHER | Age: 48
End: 2023-07-19
Payer: COMMERCIAL

## 2023-07-19 DIAGNOSIS — M54.16 LUMBAR RADICULOPATHY: Primary | ICD-10-CM

## 2023-07-20 ENCOUNTER — TELEPHONE (OUTPATIENT)
Dept: PALLIATIVE MEDICINE | Facility: OTHER | Age: 48
End: 2023-07-20
Payer: COMMERCIAL

## 2023-07-20 NOTE — TELEPHONE ENCOUNTER
Phoned the patient and left VM. Stated to call and schedule procedure with Dr. Truong.        Mary Mccarthy MA on 7/20/2023 at 1:18 PM

## 2023-07-28 ENCOUNTER — TELEPHONE (OUTPATIENT)
Dept: PALLIATIVE MEDICINE | Facility: OTHER | Age: 48
End: 2023-07-28
Payer: COMMERCIAL

## 2023-07-28 NOTE — TELEPHONE ENCOUNTER
Second attempt: Phoned the patient with [facility]  and left VM. Stated to call and schedule procedure with Dr. Truong.          Mary Mccarthy MA on 7/28/2023 at 2:21 PM

## 2023-08-10 ENCOUNTER — THERAPY VISIT (OUTPATIENT)
Dept: PHYSICAL THERAPY | Facility: REHABILITATION | Age: 48
End: 2023-08-10
Attending: PHYSICIAN ASSISTANT
Payer: COMMERCIAL

## 2023-08-10 DIAGNOSIS — M54.42 ACUTE LEFT-SIDED LOW BACK PAIN WITH LEFT-SIDED SCIATICA: ICD-10-CM

## 2023-08-10 DIAGNOSIS — R29.898 LEFT LEG WEAKNESS: ICD-10-CM

## 2023-08-10 PROCEDURE — 97162 PT EVAL MOD COMPLEX 30 MIN: CPT | Mod: GP | Performed by: PHYSICAL THERAPIST

## 2023-08-10 PROCEDURE — 97110 THERAPEUTIC EXERCISES: CPT | Mod: GP | Performed by: PHYSICAL THERAPIST

## 2023-08-10 NOTE — PROGRESS NOTES
"PHYSICAL THERAPY EVALUATION  Type of Visit: Evaluation    See electronic medical record for Abuse and Falls Screening details.    Subjective       Presenting condition or subjective complaint: Referral by doctor  Date of onset: 06/25/23    Relevant medical history:     Dates & types of surgery:      Prior diagnostic imaging/testing results: MRI     Prior therapy history for the same diagnosis, illness or injury: No      Prior Level of Function  Transfers: Independent  Ambulation: Independent    Additional Subjective: Pt woke up about 2 months ago with insidious low back pain that radiated down her L LE.  [facility] and imaging revealed L4/5 and L5/S1 involvement.  Pt now reports her pain is no longer in her L LE but rather her R LE.  Pt also reports R LE weakness and tingling.     Living Environment  Social support: With family members   Type of home: Apartment/condo   Stairs to enter the home: No       Ramp: No   Stairs inside the home: No       Help at home: Self Cares (home health aide/personal care attendant, family, etc)  Equipment owned:       Employment: Yes    Hobbies/Interests:      Patient goals for therapy: Improve joint pain    Pain assessment: See objective evaluation for additional pain details     Objective     LUMBAR SPINE EVALUATION    PAIN: Pain Level at Rest: 0/10  Pain Level with Use: 10/10  Pain Location: along low back and down R LE, mostly above knee but can go into ankle  Pain Quality: Aching  Pain Frequency: intermittent  Pain is Exacerbated By: \"I can just wake up in the morning and the pain is there.  Sometimes with lifting heavy objects, I do NOT have pain.\"  Pt has difficulty identifying exacerbating factors.   Pain is Relieved By: stretch  Pain Progression: Improved      Objective:     Note: Items left blank indicates the item was not performed or not indicated at the time of the evaluation.    Examination    Involved side: Right  Posture Observation:      General " sitting posture is  fair.    Lumbar ROM:    Date: 5/22/2023   *Indicate scale AROM   Lumbar Flexion 55 deg   Lumbar Extension 5 deg and painful    Right Left   Lumbar Sidebending limited limited     Lower Extremity Strength:   * denotes pain  Date: 5/22/2023   LE strength/5 Right Left   Hip Flexion (L1-3) 4-* 4   Hip Extension (L5-S1)     Hip Abduction (L4-5)     Knee Extension (L3-4) 4- to 3+* 4   Knee Flexion 4- to 3+* 4+   Ankle Dorsiflexion (L4-5) 4- to 3+* 4   Great Toe Extension (L5) 4- 4   Ankle Plantar flexion (S1) 4- 4   Abdominals      Sensation:    Screened and WNL           Palpation: Tenderness in R low back and piriformis    Lumbar Special Tests:     Lumbar Special Tests Right Left SI Tests Right  Left   Quadrant test   SI Compression     Straight leg raise + - SI Distraction     Crossover response - - POSH Test     Slump +  Sacral Thrust     Sit-up test  FADIR     Trunk extensor endurance test  Resisted Abduction     Prone instability test  Other:     Pubic shotgun  Other:       Repeated Motion Testing:  Does not centralize but extension is more painful. Not much pain with flexion.     Passive Mobility - Joint Integrity:  Not tested      Assessment & Plan   CLINICAL IMPRESSIONS  Medical Diagnosis: Acute left-sided low back pain with left-sided sciatica; Left leg weakness    Treatment Diagnosis: Acute low back pain with right-sided sciatica; Bilateral leg weakness   Impression/Assessment: Patient is a 47 year old female with R LE weakness and low back pain complaints.  The following significant findings have been identified: Decreased ROM/flexibility, Decreased strength, Impaired sensation, and Impaired muscle performance. These impairments interfere with their ability to perform self care tasks, work tasks, recreational activities, household mobility, and community mobility as compared to previous level of function.  Recent imaging shows posterior disc bulging at L4/L5 and L5/S1.  Pt reports her L LE  used to be more involved but now her R LE is becoming weaker and is more symptomatic.  Pt has not followed up with her referring provider, Mickie Pretty, to have her recommended injection completed. This author stresses importance  of following up to have this addressed.  Pt would benefit from skilled 1:1 PT.     Clinical Decision Making (Complexity):  Clinical Presentation: Evolving/Changing  Clinical Presentation Rationale: based on medical and personal factors listed in PT evaluation  Clinical Decision Making (Complexity): Moderate complexity    PLAN OF CARE  Treatment Interventions:  Modalities: Mechanical Traction  Interventions: Gait Training, Manual Therapy, Neuromuscular Re-education, Therapeutic Activity, Therapeutic Exercise, Self-Care/Home Management    Long Term Goals     PT Goal 1  Goal Description: Pt will be independent with her HEP for ongoing symptom managment in 12 weeks.  PT Goal 2  Goal Description: Pt will improve B LE MMT by 1/2 to 1 grade for greater ease of going up and down stairs or completing floor to stand transfers for HH chores in 12 weeks.  PT Goal 3  Goal Description: Pt will be able to bend and lift items up to 20 lbs with no c/o increased low back pain in 12 weeks.      Frequency of Treatment: 1x/wk to every other week  Duration of Treatment: 10 visits over 12 weeks    Recommended Referrals to Other Professionals:  None  Education Assessment:   Learner/Method: Patient  Education Comments: Additional time to review the importance of pt to reach out to Mickie Pretty's office to schedule her injection in her low back.  Mickie requested an urgent injection on 7/13 and f/u 2-3 weeks after the injection.  Neither has happened.  PT stresses importance of calling the neurosurgeon's office to schedule recommended interventions, especially given worsening of R side.  Pt verbalizes understanding via .    Risks and benefits of evaluation/treatment have been explained.    Patient/Family/caregiver agrees with Plan of Care.     Evaluation Time:     PT Gabi, Moderate Complexity Minutes (31767): 30   Present: Yes: Language: Yessy, ID Number/Identifier: .     Signing Clinician: Padmini Mari PT      [facility]                                                                                   OUTPATIENT PHYSICAL THERAPY      PLAN OF TREATMENT FOR OUTPATIENT REHABILITATION   Patient's Last Name, First Name, Ihsan Mac    YOB: 1975   Provider's Name   [facility]   Medical Record No.  [id number]     Onset Date: 06/25/23  Start of Care Date: 08/10/23     Medical Diagnosis:  Acute left-sided low back pain with left-sided sciatica; Left leg weakness      PT Treatment Diagnosis:  Acute low back pain with right-sided sciatica; Bilateral leg weakness Plan of Treatment  Frequency/Duration: 1x/wk to every other week/ 10 visits over 12 weeks    Certification date from 08/10/23 to 11/02/23         See note for plan of treatment details and functional goals     Padmini Mari, PT                         I CERTIFY THE NEED FOR THESE SERVICES FURNISHED UNDER        THIS PLAN OF TREATMENT AND WHILE UNDER MY CARE     (Physician attestation of this document indicates review and certification of the therapy plan).                Referring Provider:  Mickie Pretty      Initial Assessment  See Epic Evaluation- Start of Care Date: 08/10/23

## 2023-09-05 ENCOUNTER — TELEPHONE (OUTPATIENT)
Dept: PALLIATIVE MEDICINE | Facility: OTHER | Age: 48
End: 2023-09-05
Payer: COMMERCIAL

## 2023-09-05 NOTE — TELEPHONE ENCOUNTER
Third attempt: Phoned the patient with [employer]  and left VM. Stated to call and schedule procedure with Dr. Truong.

## 2024-09-03 ENCOUNTER — OFFICE VISIT (OUTPATIENT)
Dept: FAMILY MEDICINE | Facility: CLINIC | Age: 49
End: 2024-09-03
Payer: COMMERCIAL

## 2024-09-03 ENCOUNTER — HOSPITAL ENCOUNTER (OUTPATIENT)
Dept: ULTRASOUND IMAGING | Facility: HOSPITAL | Age: 49
Discharge: HOME | End: 2024-09-03
Payer: COMMERCIAL

## 2024-09-03 VITALS
DIASTOLIC BLOOD PRESSURE: 79 MMHG | TEMPERATURE: 98 F | RESPIRATION RATE: 20 BRPM | OXYGEN SATURATION: 98 % | HEART RATE: 72 BPM | SYSTOLIC BLOOD PRESSURE: 113 MMHG

## 2024-09-03 DIAGNOSIS — R60.0 BILATERAL LEG EDEMA: ICD-10-CM

## 2024-09-03 DIAGNOSIS — R60.0 BILATERAL LEG EDEMA: Primary | ICD-10-CM

## 2024-09-03 LAB
ALBUMIN SERPL BCG-MCNC: 4.1 G/DL (ref 3.5–5.2)
ALP SERPL-CCNC: 146 U/L (ref 40–150)
ALT SERPL W P-5'-P-CCNC: 38 U/L (ref 0–50)
ANION GAP SERPL CALCULATED.3IONS-SCNC: 12 MMOL/L (ref 7–15)
AST SERPL W P-5'-P-CCNC: 31 U/L (ref 0–45)
BASOPHILS # BLD AUTO: 0 10E3/UL (ref 0–0.2)
BASOPHILS NFR BLD AUTO: 0 %
BILIRUB SERPL-MCNC: 0.2 MG/DL
BUN SERPL-MCNC: 12.5 MG/DL (ref 6–20)
CALCIUM SERPL-MCNC: 9.1 MG/DL (ref 8.8–10.4)
CHLORIDE SERPL-SCNC: 100 MMOL/L (ref 98–107)
CREAT SERPL-MCNC: 0.75 MG/DL (ref 0.51–0.95)
EGFRCR SERPLBLD CKD-EPI 2021: >90 ML/MIN/1.73M2
EOSINOPHIL # BLD AUTO: 0.1 10E3/UL (ref 0–0.7)
EOSINOPHIL NFR BLD AUTO: 1 %
ERYTHROCYTE [DISTWIDTH] IN BLOOD BY AUTOMATED COUNT: 12.1 % (ref 10–15)
GLUCOSE SERPL-MCNC: 102 MG/DL (ref 70–99)
HCO3 SERPL-SCNC: 26 MMOL/L (ref 22–29)
HCT VFR BLD AUTO: 40.5 % (ref 35–47)
HGB BLD-MCNC: 12.9 G/DL (ref 11.7–15.7)
IMM GRANULOCYTES # BLD: 0 10E3/UL
IMM GRANULOCYTES NFR BLD: 0 %
LYMPHOCYTES # BLD AUTO: 3.1 10E3/UL (ref 0.8–5.3)
LYMPHOCYTES NFR BLD AUTO: 27 %
MCH RBC QN AUTO: 28.5 PG (ref 26.5–33)
MCHC RBC AUTO-ENTMCNC: 31.9 G/DL (ref 31.5–36.5)
MCV RBC AUTO: 90 FL (ref 78–100)
MONOCYTES # BLD AUTO: 0.7 10E3/UL (ref 0–1.3)
MONOCYTES NFR BLD AUTO: 6 %
NEUTROPHILS # BLD AUTO: 7.5 10E3/UL (ref 1.6–8.3)
NEUTROPHILS NFR BLD AUTO: 66 %
PLATELET # BLD AUTO: 322 10E3/UL (ref 150–450)
POTASSIUM SERPL-SCNC: 4 MMOL/L (ref 3.4–5.3)
PROT SERPL-MCNC: 7.9 G/DL (ref 6.4–8.3)
RBC # BLD AUTO: 4.52 10E6/UL (ref 3.8–5.2)
SODIUM SERPL-SCNC: 138 MMOL/L (ref 135–145)
TSH SERPL DL<=0.005 MIU/L-ACNC: 1.35 UIU/ML (ref 0.3–4.2)
WBC # BLD AUTO: 11.4 10E3/UL (ref 4–11)

## 2024-09-03 PROCEDURE — 36415 COLL VENOUS BLD VENIPUNCTURE: CPT

## 2024-09-03 PROCEDURE — 93970 EXTREMITY STUDY: CPT

## 2024-09-03 PROCEDURE — 80053 COMPREHEN METABOLIC PANEL: CPT

## 2024-09-03 PROCEDURE — 85025 COMPLETE CBC W/AUTO DIFF WBC: CPT

## 2024-09-03 PROCEDURE — 84443 ASSAY THYROID STIM HORMONE: CPT

## 2024-09-03 PROCEDURE — 99203 OFFICE O/P NEW LOW 30 MIN: CPT

## 2024-09-03 NOTE — LETTER
September 3, 2024      Obinna Wilkins  [address] [place] [address]        To Whom It May Concern:    Obinna Wilkins  was seen on 9/3/2024.  Please excuse her from heavy duties until her pain has improved (2 weeks) due to illness.        Sincerely,        Janay Ford MD

## 2024-09-03 NOTE — PROGRESS NOTES
Assessment & Plan       ICD-10-CM    1. Bilateral leg edema  R60.0 US Lower Extremity Venous Duplex Bilateral     Lymphedema Therapy  Referral     CBC with platelets and differential     Comprehensive metabolic panel     TSH with free T4 reflex     Orthopedic  Referral     CANCELED: CBC with platelets and differential     CANCELED: Comprehensive metabolic panel     CANCELED: TSH with free T4 reflex         Bilateral LE edema for 2 years. Inconsistent with HF given the lack of SOB, less likely venous stasis given good perfusion and no skin changes, less likely chronic clots given hasn't had any issue with SOB or anything and it's not markedly tender, less likely lipedema given the foot involvement. Will get basic labs: CBC, TSH, and CMP plus US doppler of both legs. If doppler normal and no answer in the other labs, will recommend evaluation with lymphedema clinic and PCP follow-up.     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   TSH: Pending    Recommend ortho follow-up for the Baker's Cyst and then an eval for lymphedema. Will update when remaining labs back.     Follow up with primary care provider with any problems, questions or concerns or if symptoms worsen or fail to improve. Patient agreed to plan and verbalized understanding.     Subjective     Dah is a 48 year old female who presents to clinic today for the following health issues:  Chief Complaint   Patient presents with    Musculoskeletal Problem     Leg pain/swelling X2 years   Pt is having a hard time walking now due to the pain and swelling      HPI    2 years of bilateral leg swelling, the entire leg. They have to massage her legs because it's very uncomfortable. No health problems, no SOB, no chest pain, no fever. Otherwise well.     Review of Systems    10 point ROS performed and negative except as noted in HPI.     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    Social History     Tobacco Use    Smoking status: Never     Passive exposure: Never    Smokeless tobacco: Not on file    Tobacco comments:     Something like chewing tobbacco   Substance Use Topics    Alcohol use: Not on file           Objective    /79   Pulse 72   Temp 98  F (36.7  C) (Tympanic)   Resp 20   SpO2 98%   Physical Exam   Constitutional:       General: Patient is not in acute distress.     Appearance: Normal appearance.   HENT:      Head: Normocephalic and atraumatic.      Right Ear: External ear normal.      Left Ear: External ear normal.      Nose: No congestion, rhinorrhea.      Mouth/Throat:      Mouth: Mucous membranes are moist.      Pharynx: Oropharynx is clear, No exudate.   Eyes:      General: No scleral icterus.     Extraocular Movements: Extraocular movements intact.      Conjunctiva/sclera: Conjunctivae normal.      Pupils: Pupils are equal, round, and reactive to light.   Pulmonary:      Effort: Pulmonary effort is normal.   Cardiovascular:      Regular heart rate  Abdominal:      General: Abdomen is flat.   Musculoskeletal:         General: Normal range of motion. Both legs edematous from feet to thighs w/o any skin discoloration, lower portion is mildly pitting.     Cervical back: Normal range of motion and neck supple.   Skin:     General: Skin is warm and dry.      Coloration: Skin is not jaundiced.      Findings: No bruising, lesion or rash.   Neurological:      General: No focal deficit present.      Mental Status: Patient is alert. Mental status is at baseline.   Psychiatric:         Mood and Affect: Mood normal.         Behavior: Behavior normal.         Thought Content: Thought content normal.       Janay Ford MD

## 2024-09-03 NOTE — LETTER
September 3, 2024      Obinna Wilkins  [address] [place] [address]        To Whom It May Concern:    Obinna Wilkins  was seen on 9/3/2024.  Please excuse her from heavy duties until her pain has improved (4 weeks from today) due to illness.        Sincerely,        Janay Ford MD